# Patient Record
Sex: FEMALE | Race: WHITE | NOT HISPANIC OR LATINO | Employment: UNEMPLOYED | URBAN - METROPOLITAN AREA
[De-identification: names, ages, dates, MRNs, and addresses within clinical notes are randomized per-mention and may not be internally consistent; named-entity substitution may affect disease eponyms.]

---

## 2024-02-02 PROBLEM — O36.80X0 PREGNANCY WITH INCONCLUSIVE FETAL VIABILITY (HHS-HCC): Status: ACTIVE | Noted: 2024-02-02

## 2024-02-02 RX ORDER — ASPIRIN 81 MG/1
2 TABLET ORAL DAILY
COMMUNITY
Start: 2020-09-24

## 2024-12-06 ENCOUNTER — APPOINTMENT (OUTPATIENT)
Dept: PRIMARY CARE | Facility: CLINIC | Age: 34
End: 2024-12-06
Payer: COMMERCIAL

## 2025-01-08 ENCOUNTER — APPOINTMENT (OUTPATIENT)
Dept: PRIMARY CARE | Facility: CLINIC | Age: 35
End: 2025-01-08
Payer: COMMERCIAL

## 2025-01-29 ENCOUNTER — APPOINTMENT (OUTPATIENT)
Dept: PRIMARY CARE | Facility: CLINIC | Age: 35
End: 2025-01-29
Payer: COMMERCIAL

## 2025-02-18 ENCOUNTER — APPOINTMENT (OUTPATIENT)
Dept: PRIMARY CARE | Facility: CLINIC | Age: 35
End: 2025-02-18
Payer: COMMERCIAL

## 2025-02-18 VITALS
HEIGHT: 63 IN | TEMPERATURE: 98.4 F | DIASTOLIC BLOOD PRESSURE: 84 MMHG | OXYGEN SATURATION: 98 % | BODY MASS INDEX: 39.87 KG/M2 | HEART RATE: 94 BPM | SYSTOLIC BLOOD PRESSURE: 136 MMHG | WEIGHT: 225 LBS

## 2025-02-18 DIAGNOSIS — M54.50 CHRONIC MIDLINE LOW BACK PAIN WITHOUT SCIATICA: Primary | ICD-10-CM

## 2025-02-18 DIAGNOSIS — Z00.00 WELL ADULT EXAM: ICD-10-CM

## 2025-02-18 DIAGNOSIS — Z30.8 ENCOUNTER FOR OTHER CONTRACEPTIVE MANAGEMENT: ICD-10-CM

## 2025-02-18 DIAGNOSIS — G89.29 CHRONIC MIDLINE LOW BACK PAIN WITHOUT SCIATICA: Primary | ICD-10-CM

## 2025-02-18 PROCEDURE — 4004F PT TOBACCO SCREEN RCVD TLK: CPT | Performed by: PHYSICIAN ASSISTANT

## 2025-02-18 PROCEDURE — 3008F BODY MASS INDEX DOCD: CPT | Performed by: PHYSICIAN ASSISTANT

## 2025-02-18 PROCEDURE — 99214 OFFICE O/P EST MOD 30 MIN: CPT | Performed by: PHYSICIAN ASSISTANT

## 2025-02-18 RX ORDER — TIZANIDINE 4 MG/1
4 TABLET ORAL EVERY 6 HOURS PRN
Qty: 30 TABLET | Refills: 0 | Status: SHIPPED | OUTPATIENT
Start: 2025-02-18 | End: 2025-02-28

## 2025-02-18 RX ORDER — IBUPROFEN 600 MG/1
600 TABLET ORAL EVERY 6 HOURS PRN
Qty: 40 TABLET | Refills: 0 | Status: SHIPPED | OUTPATIENT
Start: 2025-02-18 | End: 2025-02-28

## 2025-02-18 ASSESSMENT — PATIENT HEALTH QUESTIONNAIRE - PHQ9
2. FEELING DOWN, DEPRESSED OR HOPELESS: NOT AT ALL
SUM OF ALL RESPONSES TO PHQ9 QUESTIONS 1 AND 2: 0
1. LITTLE INTEREST OR PLEASURE IN DOING THINGS: NOT AT ALL

## 2025-02-18 NOTE — PROGRESS NOTES
"Subjective   Patient ID: Argentina Rodriguez is a 35 y.o. female who presents for Fatigue and Contraception.    HPI  PT presents to establish care with complaint of back pain sx's.  PT works as an [employer] and notes she has been developing low back pain sx's. She notes it is worse midline lower back - denies radiation of pain into her LE's - numbness or tingling sx's,  Changes in bowel or bladder function. She denies any hx of trauma or injury. PT reports wearing CROC clogs all day - she has note tried any OTC agents for her discomfort.     PT also wishes to discuss birth control options. Pt denies being on OBC in the past - but is concerned as she does not know how reliable she will be taking pills without missing dosages. PT denies hx of clotting disorder - she is also a smoker.   PT is not sure when her last pap was but does state she has not had any abnormal pap tests.     Review of Systems  Constitutional: Negative.    HENT: Negative.     Respiratory: Negative.  Negative for cough, shortness of breath and wheezing.    Cardiovascular: Negative.  Negative for chest pain and palpitations.   Gastrointestinal: Negative.    Musculoskeletal: Negative.    Neurological: Negative.    Hematological: Negative.    Psychiatric/Behavioral: Negative.     All other systems reviewed and are negative.      Objective   /84   Pulse 94   Temp 36.9 °C (98.4 °F) (Oral)   Ht 1.6 m (5' 3\")   Wt 102 kg (225 lb)   LMP 01/24/2025 (Exact Date)   SpO2 98%   BMI 39.86 kg/m²     Physical Exam  Vitals and nursing note reviewed.   Constitutional:       General Appearance: Normal appearance, no distress, not ill-appearing.   HENT:      Head: Normocephalic and atraumatic.      Mouth/Throat:  MMM, Oropharynx is clear without erythema or exudate  Eyes:      Conjunctiva/sclera: Conjunctivae normal.   Cardiovascular:      Normal rate and regular rhythm: Normal heart sounds.   Pulmonary:      Pulmonary effort is normal. Normal breath sounds. No " wheezing.   Abdominal:      General: Bowel sounds are normal. Abdomen is soft, non- tender, no masses.  Musculoskeletal:         General: Normal range of motion -No TTP in lumbar spine - small pain/discomfort noted with ROM testing.     Lymphadenopathy:      Cervical:  Supple, non-tender, no cervical adenopathy.   Skin:     General: Skin is warm and dry, no rash or jaundice.    Neurological:      No focal deficit present. Alert and oriented to person, place, and time.   Psychiatric:         Mood and Affect: Mood normal.         Behavior: Behavior normal.         Thought Content: Thought content normal.         Judgment: Judgment normal.       Assessment/Plan  Pt presents to [facility] care with complaint of LBP sx;s. She has not had this evaluated in the past nor has she taken any OTC agents. She will be referred for Xrays and physical therapy. Will provide her with ibuprofen and zanaflex as needed for her discomfort sx's. PT also presents with request to discuss contraception. She is a smoker and does not believe she can consistently take OBC. PT will be referred to GYN to discuss possible IUD placement. She will follow up for a well exam in the near future - sooner if she has any complications.   Diagnoses and all orders for this visit:  Chronic midline low back pain without sciatica  -     XR lumbar spine 2-3 views; Future  -     Referral to Physical Therapy; Future  -     tiZANidine (Zanaflex) 4 mg tablet; Take 1 tablet (4 mg) by mouth every 6 hours if needed for muscle spasms for up to 10 days.  -     ibuprofen 600 mg tablet; Take 1 tablet (600 mg) by mouth every 6 hours if needed for moderate pain (4 - 6) (pain) for up to 10 days. Take with food.  Encounter for other contraceptive management  -     Referral to Gynecology; Future  Well adult exam  -     Comprehensive Metabolic Panel; Future  -     Lipid Panel; Future  -     CBC; Future  -     Hemoglobin A1C; Future

## 2025-03-17 ENCOUNTER — APPOINTMENT (OUTPATIENT)
Dept: PRIMARY CARE | Facility: CLINIC | Age: 35
End: 2025-03-17
Payer: COMMERCIAL

## 2025-04-08 ENCOUNTER — APPOINTMENT (OUTPATIENT)
Dept: PRIMARY CARE | Facility: CLINIC | Age: 35
End: 2025-04-08
Payer: COMMERCIAL

## 2025-04-22 ENCOUNTER — APPOINTMENT (OUTPATIENT)
Dept: OBSTETRICS AND GYNECOLOGY | Facility: CLINIC | Age: 35
End: 2025-04-22
Payer: COMMERCIAL

## 2025-04-29 ENCOUNTER — APPOINTMENT (OUTPATIENT)
Dept: OBSTETRICS AND GYNECOLOGY | Facility: CLINIC | Age: 35
End: 2025-04-29
Payer: COMMERCIAL

## 2025-04-29 DIAGNOSIS — Z11.51 SCREENING FOR HPV (HUMAN PAPILLOMAVIRUS): ICD-10-CM

## 2025-04-29 DIAGNOSIS — Z12.4 SCREENING FOR CERVICAL CANCER: ICD-10-CM

## 2025-05-07 ENCOUNTER — APPOINTMENT (OUTPATIENT)
Dept: PRIMARY CARE | Facility: CLINIC | Age: 35
End: 2025-05-07
Payer: COMMERCIAL

## 2025-07-16 ENCOUNTER — APPOINTMENT (OUTPATIENT)
Dept: PRIMARY CARE | Facility: CLINIC | Age: 35
End: 2025-07-16
Payer: COMMERCIAL

## 2025-07-18 ENCOUNTER — OFFICE VISIT (OUTPATIENT)
Dept: URGENT CARE | Age: 35
End: 2025-07-18
Payer: COMMERCIAL

## 2025-07-18 VITALS
RESPIRATION RATE: 17 BRPM | DIASTOLIC BLOOD PRESSURE: 90 MMHG | OXYGEN SATURATION: 99 % | TEMPERATURE: 98.5 F | HEART RATE: 89 BPM | SYSTOLIC BLOOD PRESSURE: 144 MMHG

## 2025-07-18 DIAGNOSIS — M25.522 LEFT ELBOW PAIN: ICD-10-CM

## 2025-07-18 DIAGNOSIS — S56.912A ELBOW STRAIN, LEFT, INITIAL ENCOUNTER: Primary | ICD-10-CM

## 2025-07-18 PROCEDURE — 99213 OFFICE O/P EST LOW 20 MIN: CPT

## 2025-07-18 RX ORDER — CYCLOBENZAPRINE HCL 10 MG
10 TABLET ORAL NIGHTLY PRN
Qty: 30 TABLET | Refills: 0 | Status: SHIPPED | OUTPATIENT
Start: 2025-07-18 | End: 2025-09-16

## 2025-07-18 RX ORDER — METHYLPREDNISOLONE 4 MG/1
TABLET ORAL
Qty: 21 TABLET | Refills: 0 | Status: SHIPPED | OUTPATIENT
Start: 2025-07-18 | End: 2025-07-24

## 2025-07-18 NOTE — PROGRESS NOTES
Subjective   Patient ID: Argentina Rodriguez is a 35 y.o. female. They present today with a chief complaint of Elbow Injury (Complaint of left elbow  pain, and numbness since lifting injury today, [employer]. ).    History of Present Illness  HPI 35-year-old female arrives to clinic with chief complaint of left elbow injury.  The patient reports having left elbow pain after attempting to lift a heavy patient.  She is in SDMA at a nearby facility.  She states that she did not hear any pops or tears however did have to drop the patient's leg due to the pain.  She has not use any pain relief medications.  She is here for evaluation.    Past Medical History  Allergies as of 07/18/2025    (No Known Allergies)       Prescriptions Prior to Admission[1]     Medical History[2]    Surgical History[3]     reports that she has been smoking cigarettes. She started smoking about 10 years ago. She has a 5.3 pack-year smoking history. She has never used smokeless tobacco. She reports current alcohol use of about 3.0 standard drinks of alcohol per week. She reports that she does not currently use drugs.    Review of Systems  Review of Systems  Joint pain, myalgia, arthralgia    Objective    Vitals:    07/18/25 1847   BP: 144/90   Pulse: 89   Resp: 17   Temp: 36.9 °C (98.5 °F)   SpO2: 99%     Patient's last menstrual period was 06/21/2025 (approximate).    Physical Exam  Pain with supination pronation of the left upper extremity.  Procedures    Point of Care Test & Imaging Results from this visit  No results found for this visit on 07/18/25.   Imaging  No results found.    Cardiology, Vascular, and Other Imaging  No other imaging results found for the past 2 days      Diagnostic study results (if any) were reviewed by SILVIA Tierney.    Assessment/Plan   Allergies, medications, history, and pertinent labs/EKGs/Imaging reviewed by SILVIA Tierney.     Medical Decision Making  Pain with supination pronation of the left  upper extremity.  There is no obvious deformities, lacerations, contusions, ecchymosis.  No numbness or ting identified at this time.  Patient maintained neurovascular structure throughout the entire duration of this appointment.  Given the patient's symptoms, this is more than likely a left elbow strain given the lack of obvious trauma.  I did offer the patient x-rays however the patient declined at this time.  Do recommend RICE techniques with Tylenol Motrin.  Ortho referral placed.  Medrol Dosepak and Flexeril sent.  Follow-up with your primary care provider.    As a result of the work-up, the patient was discharged home.  she was informed of her diagnosis and instructed to come back with any concerns or worsening of condition.  she and was agreeable to the plan as discussed above.  she was given the opportunity to ask questions.  All of the patient's questions were answered.    This document was generated using the assistance of voice recognition software. If there are any errors of spelling, grammar, syntax, or meaning; please feel free to contact me directly for clarification.     Orders and Diagnoses  Diagnoses and all orders for this visit:  Elbow strain, left, initial encounter  -     cyclobenzaprine (Flexeril) 10 mg tablet; Take 1 tablet (10 mg) by mouth as needed at bedtime for muscle spasms.  -     methylPREDNISolone (Medrol Dospak) 4 mg tablets; Take as directed on package.  -     Referral to Orthopedics and Sports Medicine; Future  Left elbow pain  -     cyclobenzaprine (Flexeril) 10 mg tablet; Take 1 tablet (10 mg) by mouth as needed at bedtime for muscle spasms.  -     methylPREDNISolone (Medrol Dospak) 4 mg tablets; Take as directed on package.  -     Referral to Orthopedics and Sports Medicine; Future      Medical Admin Record      Patient disposition: Home    Electronically signed by SILVIA Tierney  7:07 PM           [1] (Not in a hospital admission)   [2]   Past Medical  History:  Diagnosis Date    Encounter for supervision of normal pregnancy, unspecified, first trimester 06/01/2021    Supervision of normal pregnancy in first trimester    Encounter for supervision of normal pregnancy, unspecified, second trimester 06/01/2021    Supervision of normal pregnancy in second trimester    Encounter for supervision of normal pregnancy, unspecified, third trimester 06/01/2021    Supervision of normal pregnancy in third trimester    Personal history of other diseases of the digestive system     History of gallbladder disease   [3] History reviewed. No pertinent surgical history.

## 2025-07-18 NOTE — PATIENT INSTRUCTIONS
Please use OTC tylenol and motrin.    Flexeril and a medrol dose pack sent    Follow up with orthopedic as advised.     Cleared to return to work with no restrictions.

## 2025-09-04 ENCOUNTER — APPOINTMENT (OUTPATIENT)
Dept: PRIMARY CARE | Facility: CLINIC | Age: 35
End: 2025-09-04
Payer: COMMERCIAL

## 2025-10-08 ENCOUNTER — APPOINTMENT (OUTPATIENT)
Dept: PRIMARY CARE | Facility: CLINIC | Age: 35
End: 2025-10-08
Payer: COMMERCIAL